# Patient Record
(demographics unavailable — no encounter records)

---

## 2025-05-11 NOTE — HEALTH RISK ASSESSMENT
[Excellent] : ~his/her~  mood as  excellent [Intercurrent ED visits] : went to ED [1 or 2 (0 pts)] : 1 or 2 (0 points) [Never (0 pts)] : Never (0 points) [No] : In the past 12 months have you used drugs other than those required for medical reasons? No [No falls in past year] : Patient reported no falls in the past year [Little interest or pleasure doing things] : 1) Little interest or pleasure doing things [Feeling down, depressed, or hopeless] : 2) Feeling down, depressed, or hopeless [0] : 2) Feeling down, depressed, or hopeless: Not at all (0) [PHQ-2 Negative - No further assessment needed] : PHQ-2 Negative - No further assessment needed [None] : Patient does not have any barriers to medication adherence [Yes] : Reviewed medication list for presence of high-risk medications. [Former] : Former [0-4] : 0-4 [NO] : No [Patient declined mammogram] : Patient declined mammogram [Patient declined PAP Smear] : Patient declined PAP Smear [Patient declined bone density test] : Patient declined bone density test [HIV test declined] : HIV test declined [Hepatitis C test declined] : Hepatitis C test declined [With Family] : lives with family [Employed] : employed [Significant Other] : lives with significant other [Feels Safe at Home] : Feels safe at home [Fully functional (bathing, dressing, toileting, transferring, walking, feeding)] : Fully functional (bathing, dressing, toileting, transferring, walking, feeding) [Fully functional (using the telephone, shopping, preparing meals, housekeeping, doing laundry, using] : Fully functional and needs no help or supervision to perform IADLs (using the telephone, shopping, preparing meals, housekeeping, doing laundry, using transportation, managing medications and managing finances) [Smoke Detector] : smoke detector [Carbon Monoxide Detector] : carbon monoxide detector [Safety elements used in home] : safety elements used in home [Seat Belt] :  uses seat belt [de-identified] : shoulder injury [Change in mental status noted] : No change in mental status noted [Language] : denies difficulty with language [Behavior] : denies difficulty with behavior [Sexually Active] : not sexually active [High Risk Behavior] : no high risk behavior [Reports changes in hearing] : Reports no changes in hearing [Reports changes in vision] : Reports no changes in vision [Reports changes in dental health] : Reports no changes in dental health [PapSmearComments] : NA [ColonoscopyComments] : Due [de-identified] : gf and dtr 28 [FreeTextEntry2] : construction

## 2025-05-11 NOTE — INTERPRETER SERVICES
[Language Line ] : provided by [employer]   [Interpreters_IDNumber] : [id number] [Interpreters_FullName] : Barbara [TWNoteComboBox1] : [place]ian

## 2025-05-11 NOTE — HISTORY OF PRESENT ILLNESS
[FreeTextEntry1] : Rt shoulder pain [de-identified] : 62M no pmhx Adhesive capsulitis x 3 weeks. Ibuprofen prn. pending pt start. Pain improving. main c.o crepitus.   Imm: s/p COVID vaxx x3 Moderna  All: pollen -- could not find in [employer] to add  Meds: uses benadryl PRN for his allergies ~ use 1 pill 3x/week  PMH: denies  PSH : denies  Fhx: dad passed at age 76 from stroke, mother passed from old age -- ? related to lung disease from coal fume exposure  SHx:  lives with wife, and dtr  denies tobacco currently. 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